# Patient Record
Sex: MALE | Race: OTHER | HISPANIC OR LATINO | Employment: FULL TIME | URBAN - METROPOLITAN AREA
[De-identification: names, ages, dates, MRNs, and addresses within clinical notes are randomized per-mention and may not be internally consistent; named-entity substitution may affect disease eponyms.]

---

## 2020-10-14 ENCOUNTER — HOSPITAL ENCOUNTER (EMERGENCY)
Facility: HOSPITAL | Age: 35
Discharge: HOME | End: 2020-10-14
Attending: EMERGENCY MEDICINE | Admitting: EMERGENCY MEDICINE

## 2020-10-14 VITALS
TEMPERATURE: 98.6 F | OXYGEN SATURATION: 99 % | DIASTOLIC BLOOD PRESSURE: 89 MMHG | SYSTOLIC BLOOD PRESSURE: 146 MMHG | HEART RATE: 96 BPM | WEIGHT: 206.3 LBS | RESPIRATION RATE: 18 BRPM

## 2020-10-14 DIAGNOSIS — L23.7 POISON IVY DERMATITIS: Primary | ICD-10-CM

## 2020-10-14 PROCEDURE — 99284 EMERGENCY DEPT VISIT MOD MDM: CPT | Performed by: EMERGENCY MEDICINE

## 2020-10-14 PROCEDURE — 99282 EMERGENCY DEPT VISIT SF MDM: CPT

## 2020-10-14 RX ORDER — DIPHENHYDRAMINE HCL 25 MG
25 TABLET ORAL EVERY 6 HOURS PRN
Qty: 20 TABLET | Refills: 0 | Status: SHIPPED | OUTPATIENT
Start: 2020-10-14

## 2020-10-14 RX ORDER — PREDNISONE 10 MG/1
TABLET ORAL
Qty: 26 TABLET | Refills: 0 | Status: SHIPPED | OUTPATIENT
Start: 2020-10-14

## 2020-10-14 RX ORDER — PREDNISONE 20 MG/1
60 TABLET ORAL ONCE
Status: COMPLETED | OUTPATIENT
Start: 2020-10-14 | End: 2020-10-14

## 2020-10-14 RX ADMIN — PREDNISONE 60 MG: 20 TABLET ORAL at 17:41

## 2021-08-03 ENCOUNTER — HOSPITAL ENCOUNTER (EMERGENCY)
Facility: HOSPITAL | Age: 36
Discharge: HOME | End: 2021-08-03

## 2021-08-03 VITALS
HEART RATE: 80 BPM | OXYGEN SATURATION: 99 % | TEMPERATURE: 98.7 F | RESPIRATION RATE: 18 BRPM | SYSTOLIC BLOOD PRESSURE: 130 MMHG | DIASTOLIC BLOOD PRESSURE: 80 MMHG

## 2021-08-03 DIAGNOSIS — N47.2 PARAPHIMOSIS: Primary | ICD-10-CM

## 2021-08-03 LAB
ALBUMIN SERPL BCP-MCNC: 4.4 G/DL (ref 3.4–4.8)
ALP SERPL-CCNC: 83.7 U/L (ref 10–129)
ALT SERPL W P-5'-P-CCNC: 12 U/L (ref 5–63)
ANION GAP SERPL CALCULATED.3IONS-SCNC: 9 MMOL/L (ref 4–13)
AST SERPL W P-5'-P-CCNC: 13 U/L (ref 15–41)
BACTERIA UR QL AUTO: ABNORMAL /HPF
BASOPHILS # BLD AUTO: 0.03 THOUSANDS/ΜL (ref 0–0.1)
BASOPHILS NFR BLD AUTO: 0 % (ref 0–1)
BILIRUB SERPL-MCNC: 0.89 MG/DL (ref 0.3–1.2)
BILIRUB UR QL STRIP: ABNORMAL
BUN SERPL-MCNC: 13 MG/DL (ref 6–20)
CALCIUM SERPL-MCNC: 9.7 MG/DL (ref 8.4–10.2)
CHLORIDE SERPL-SCNC: 100 MMOL/L (ref 96–108)
CLARITY UR: CLEAR
CO2 SERPL-SCNC: 25 MMOL/L (ref 22–33)
COLOR UR: YELLOW
CREAT SERPL-MCNC: 1.05 MG/DL (ref 0.5–1.2)
EOSINOPHIL # BLD AUTO: 0.09 THOUSAND/ΜL (ref 0–0.61)
EOSINOPHIL NFR BLD AUTO: 1 % (ref 0–6)
ERYTHROCYTE [DISTWIDTH] IN BLOOD BY AUTOMATED COUNT: 13.7 % (ref 11.6–15.1)
GFR SERPL CREATININE-BSD FRML MDRD: 93 ML/MIN/1.73SQ M
GLUCOSE SERPL-MCNC: 110 MG/DL (ref 65–140)
GLUCOSE SERPL-MCNC: 115 MG/DL (ref 65–140)
GLUCOSE UR STRIP-MCNC: NEGATIVE MG/DL
HCT VFR BLD AUTO: 47.2 % (ref 36.5–49.3)
HGB BLD-MCNC: 16.2 G/DL (ref 12–17)
HGB UR QL STRIP.AUTO: ABNORMAL
IMM GRANULOCYTES # BLD AUTO: 0.04 THOUSAND/UL (ref 0–0.2)
IMM GRANULOCYTES NFR BLD AUTO: 0 % (ref 0–2)
KETONES UR STRIP-MCNC: ABNORMAL MG/DL
LEUKOCYTE ESTERASE UR QL STRIP: ABNORMAL
LYMPHOCYTES # BLD AUTO: 1.94 THOUSANDS/ΜL (ref 0.6–4.47)
LYMPHOCYTES NFR BLD AUTO: 20 % (ref 14–44)
MCH RBC QN AUTO: 29.7 PG (ref 26.8–34.3)
MCHC RBC AUTO-ENTMCNC: 34.3 G/DL (ref 31.4–37.4)
MCV RBC AUTO: 87 FL (ref 82–98)
MONOCYTES # BLD AUTO: 0.75 THOUSAND/ΜL (ref 0.17–1.22)
MONOCYTES NFR BLD AUTO: 8 % (ref 4–12)
MUCOUS THREADS UR QL AUTO: ABNORMAL
NEUTROPHILS # BLD AUTO: 7.02 THOUSANDS/ΜL (ref 1.85–7.62)
NEUTS SEG NFR BLD AUTO: 71 % (ref 43–75)
NITRITE UR QL STRIP: NEGATIVE
NON-SQ EPI CELLS URNS QL MICRO: ABNORMAL /HPF
PH UR STRIP.AUTO: 6.5 [PH]
PLATELET # BLD AUTO: 302 THOUSANDS/UL (ref 149–390)
PMV BLD AUTO: 9.1 FL (ref 8.9–12.7)
POTASSIUM SERPL-SCNC: 3.9 MMOL/L (ref 3.5–5)
PROT SERPL-MCNC: 7.7 G/DL (ref 6.4–8.3)
PROT UR STRIP-MCNC: NEGATIVE MG/DL
RBC # BLD AUTO: 5.45 MILLION/UL (ref 3.88–5.62)
RBC #/AREA URNS AUTO: ABNORMAL /HPF
SODIUM SERPL-SCNC: 134 MMOL/L (ref 133–145)
SP GR UR STRIP.AUTO: 1.02 (ref 1–1.03)
UROBILINOGEN UR QL STRIP.AUTO: 1 E.U./DL
WBC # BLD AUTO: 9.87 THOUSAND/UL (ref 4.31–10.16)
WBC #/AREA URNS AUTO: ABNORMAL /HPF

## 2021-08-03 PROCEDURE — 82948 REAGENT STRIP/BLOOD GLUCOSE: CPT

## 2021-08-03 PROCEDURE — 99283 EMERGENCY DEPT VISIT LOW MDM: CPT

## 2021-08-03 PROCEDURE — 99284 EMERGENCY DEPT VISIT MOD MDM: CPT | Performed by: PHYSICIAN ASSISTANT

## 2021-08-03 PROCEDURE — 85025 COMPLETE CBC W/AUTO DIFF WBC: CPT | Performed by: PHYSICIAN ASSISTANT

## 2021-08-03 PROCEDURE — 81001 URINALYSIS AUTO W/SCOPE: CPT | Performed by: PHYSICIAN ASSISTANT

## 2021-08-03 PROCEDURE — 80053 COMPREHEN METABOLIC PANEL: CPT | Performed by: PHYSICIAN ASSISTANT

## 2021-08-03 PROCEDURE — 36415 COLL VENOUS BLD VENIPUNCTURE: CPT | Performed by: PHYSICIAN ASSISTANT

## 2021-08-03 RX ORDER — TRIAMCINOLONE ACETONIDE 1 MG/G
CREAM TOPICAL 2 TIMES DAILY
Qty: 30 G | Refills: 0 | Status: SHIPPED | OUTPATIENT
Start: 2021-08-03

## 2021-08-03 RX ORDER — IBUPROFEN 600 MG/1
600 TABLET ORAL ONCE
Status: COMPLETED | OUTPATIENT
Start: 2021-08-03 | End: 2021-08-03

## 2021-08-03 RX ORDER — CEPHALEXIN 500 MG/1
500 CAPSULE ORAL 3 TIMES DAILY
Qty: 21 CAPSULE | Refills: 0 | Status: SHIPPED | OUTPATIENT
Start: 2021-08-03 | End: 2021-08-10

## 2021-08-03 RX ORDER — FLUCONAZOLE 100 MG/1
200 TABLET ORAL ONCE
Status: COMPLETED | OUTPATIENT
Start: 2021-08-03 | End: 2021-08-03

## 2021-08-03 RX ADMIN — IBUPROFEN 600 MG: 600 TABLET, FILM COATED ORAL at 08:58

## 2021-08-03 RX ADMIN — FLUCONAZOLE 200 MG: 100 TABLET ORAL at 08:59

## 2021-08-03 NOTE — DISCHARGE INSTRUCTIONS
Use Tylenol 650mg every 4 hours or Motrin 600mg every 6 hours; you can alternate the 2 medications taking something every 3 hours for pain  Take all oral antibiotics until done  Keep area clean and make sure you retract the foreskin back over the tip after cleaning  Mantenga el area limpia y asegurese de retraer el prepucio sobre el pene despues de la limpieza  Use steroid cream to swollen area until cleared  Use over the counter "Tinactin powder" to area while working to keep area dry from sweating  Use crema con esteroides en el area hinchanda hasta que se aclare  Use "Tinactin powder" en el area mientras trabaja para mantener el area seca sin sudar    Follow-up with urologist in next few days

## 2021-08-03 NOTE — ED PROVIDER NOTES
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 Taking? diphenhydrAMINE (BENADRYL) 25 mg tablet   No No   Sig: Take 1 tablet (25 mg total) by mouth every 6 (six) hours as needed for itching for up to 15 doses   predniSONE 10 mg tablet   No No   Sig: 3 tabs BID x 2 days, then 2 tabs BID x 2 days, then 1 tab BID x 2 day the 1 tab daily x 2 day      Facility-Administered Medications: None       History reviewed  No pertinent past medical history  Past Surgical History:   Procedure Laterality Date    APPENDECTOMY         History reviewed  No pertinent family history  I have reviewed and agree with the history as documented  E-Cigarette/Vaping    E-Cigarette Use Never User      E-Cigarette/Vaping Substances     Social History     Tobacco Use    Smoking status: Never Smoker    Smokeless tobacco: Never Used   Vaping Use    Vaping Use: Never used   Substance Use Topics    Alcohol use: Yes    Drug use: Not Currently       Review of Systems   Constitutional: Negative for chills and fever  HENT: Negative for sore throat and trouble swallowing      Eyes: Negative for discharge and visual disturbance  Respiratory: Negative for shortness of breath  Cardiovascular: Negative for chest pain  Gastrointestinal: Negative for abdominal pain, blood in stool, constipation, diarrhea, nausea and vomiting  Genitourinary: Positive for penile pain and penile swelling  Negative for difficulty urinating, discharge, dysuria, flank pain, frequency, genital sores, hematuria, scrotal swelling, testicular pain and urgency  Musculoskeletal: Negative for arthralgias and myalgias  Skin: Negative for color change and pallor  Neurological: Negative for dizziness and weakness  Psychiatric/Behavioral: Negative for behavioral problems  All other systems reviewed and are negative  Physical Exam  Physical Exam  Vitals and nursing note reviewed  Exam conducted with a chaperone present  Constitutional:       General: He is not in acute distress  Appearance: He is well-developed  He is obese  HENT:      Head: Normocephalic and atraumatic  Right Ear: External ear normal       Left Ear: External ear normal       Nose: Nose normal       Mouth/Throat:      Mouth: Mucous membranes are moist       Pharynx: Oropharynx is clear  Eyes:      Conjunctiva/sclera: Conjunctivae normal    Cardiovascular:      Rate and Rhythm: Normal rate  Pulmonary:      Effort: Pulmonary effort is normal  No respiratory distress  Abdominal:      General: Bowel sounds are normal       Palpations: Abdomen is soft  Tenderness: There is no abdominal tenderness  Hernia: No hernia is present  Genitourinary:     Penis: Uncircumcised  Paraphimosis, tenderness and swelling present  Testes: Normal  Cremasteric reflex is present  Musculoskeletal:         General: Normal range of motion  Cervical back: Normal range of motion  Right lower leg: No edema  Left lower leg: No edema  Skin:     General: Skin is warm and dry  Capillary Refill: Capillary refill takes less than 2 seconds        Findings: No lesion or rash  Neurological:      General: No focal deficit present  Mental Status: He is alert and oriented to person, place, and time  Motor: No weakness  Psychiatric:         Behavior: Behavior normal          Vital Signs  ED Triage Vitals   Temperature Pulse Respirations Blood Pressure SpO2   08/03/21 0825 08/03/21 0825 08/03/21 0825 08/03/21 0825 08/03/21 0825   98 7 °F (37 1 °C) 84 18 138/96 100 %      Temp Source Heart Rate Source Patient Position - Orthostatic VS BP Location FiO2 (%)   08/03/21 0825 08/03/21 0825 08/03/21 0825 08/03/21 0825 --   Oral Monitor Sitting Left arm       Pain Score       08/03/21 0920       No Pain           Vitals:    08/03/21 0825 08/03/21 0930 08/03/21 1015   BP: 138/96 131/82 130/80   Pulse: 84 81 80   Patient Position - Orthostatic VS: Sitting           Visual Acuity      ED Medications  Medications   fluconazole (DIFLUCAN) tablet 200 mg (200 mg Oral Given 8/3/21 0859)   ibuprofen (MOTRIN) tablet 600 mg (600 mg Oral Given 8/3/21 0858)       Diagnostic Studies  Results Reviewed     Procedure Component Value Units Date/Time    UA w Reflex to Microscopic w Reflex to Culture [[id number]]  (Abnormal) Collected: 08/03/21 1017    Lab Status: Final result Specimen: Urine, Clean Catch Updated: 08/03/21 1028     Color, UA Yellow     Clarity, UA Clear     Specific Rindge, UA 1 020     pH, UA 6 5     Leukocytes, UA Trace     Nitrite, UA Negative     Protein, UA Negative mg/dl      Glucose, UA Negative mg/dl      Ketones, UA Trace mg/dl      Urobilinogen, UA 1 0 E U /dl      Bilirubin, UA 1+     Blood, UA 2+    Urine Microscopic [[id number]] Collected: 08/03/21 1017    Lab Status:  In process Specimen: Urine, Clean Catch Updated: 08/03/21 1028    Comprehensive metabolic panel [[id number]]  (Abnormal) Collected: 08/03/21 0858    Lab Status: Final result Specimen: Blood from Arm, Left Updated: 08/03/21 0920     Sodium 134 mmol/L      Potassium 3 9 mmol/L      Chloride 100 mmol/L      CO2 25 mmol/L      ANION GAP 9 mmol/L      BUN 13 mg/dL      Creatinine 1 05 mg/dL      Glucose 110 mg/dL      Calcium 9 7 mg/dL      AST 13 U/L      ALT 12 U/L      Alkaline Phosphatase 83 7 U/L      Total Protein 7 7 g/dL      Albumin 4 4 g/dL      Total Bilirubin 0 89 mg/dL      eGFR 93 ml/min/1 73sq m     Narrative:      National Kid[employer] Disease Foundation guidelines for Chronic Kidney Disease (CKD):     Stage 1 with normal or high GFR (GFR > 90 mL/min/1 73 square meters)    Stage 2 Mild CKD (GFR = 60-89 mL/min/1 73 square meters)    Stage 3A Moderate CKD (GFR = 45-59 mL/min/1 73 square meters)    Stage 3B Moderate CKD (GFR = 30-44 mL/min/1 73 square meters)    Stage 4 Severe CKD (GFR = 15-29 mL/min/1 73 square meters)    Stage 5 End Stage CKD (GFR <15 mL/min/1 73 square meters)  Note: GFR calculation is accurate only with a steady state creatinine    CBC and differential [[id number]]  (Normal) Collected: 08/03/21 0858    Lab Status: Final result Specimen: Blood from Arm, Left Updated: 08/03/21 0903     WBC 9 87 Thousand/uL      RBC 5 45 Million/uL      Hemoglobin 16 2 g/dL      Hematocrit 47 2 %      MCV 87 fL      MCH 29 7 pg      MCHC 34 3 g/dL      RDW 13 7 %      MPV 9 1 fL      Platelets 964 Thousands/uL      Neutrophils Relative 71 %      Immat GRANS % 0 %      Lymphocytes Relative 20 %      Monocytes Relative 8 %      Eosinophils Relative 1 %      Basophils Relative 0 %      Neutrophils Absolute 7 02 Thousands/µL      Immature Grans Absolute 0 04 Thousand/uL      Lymphocytes Absolute 1 94 Thousands/µL      Monocytes Absolute 0 75 Thousand/µL      Eosinophils Absolute 0 09 Thousand/µL      Basophils Absolute 0 03 Thousands/µL     Fingerstick Glucose (POCT) [[id number]]  (Normal) Collected: 08/03/21 0851    Lab Status: Final result Updated: 08/03/21 0852     POC Glucose 115 mg/dl                  No orders to display              Procedures  Procedures         ED Course  ED Course as of Aug 13 1052   Tu Aug 03, 2021   1002 Cbc, cmp wnl, awaiting UA collection                                              MDM  Number of Diagnoses or Management Options  Paraphimosis  Diagnosis management comments: Attending, Dr Carmina Hutson evaluating pt with me, performed gentle pressure and retraction of foreskin back over paraphimosis, pt feeling better  Glu 115, labs wnl, will cover skin and urine with keflex and steroid cream   Keep foreskin pulled over tip for next few days until swelling improved       Amount and/or Complexity of Data Reviewed  Clinical lab tests: ordered and reviewed  Review and summarize past medical records: yes  Discuss the patient with other providers: yes        Disposition  Final diagnoses:   Paraphimosis     Time reflects when diagnosis was documented in both MDM as applicable and the Disposition within this note     Time User Action Codes Description Comment    8/3/2021  8:55 AM Jamil Barker Add [N47 2] Paraphimosis       ED Disposition     ED Disposition Condition Date/Time Comment    Discharge Stable Tue Aug 3, 2021 10:50 AM Sherri Jung discharge to home/self care  Follow-up Information     Follow up With Specialties Details Why Contact Info    Papo Jean-Baptiste MD Urology   [address]  [phone]            Patient's Medications   Discharge Prescriptions    CEPHALEXIN (KEFLEX) 500 MG CAPSULE    Take 1 capsule (500 mg total) by mouth 3 (three) times a day for 7 days       Start Date: 8/3/2021  End Date: 8/10/2021       Order Dose: 500 mg       Quantity: 21 capsule    Refills: 0    TRIAMCINOLONE (KENALOG) 0 1 % CREAM    Apply topically 2 (two) times a day       Start Date: 8/3/2021  End Date: --       Order Dose: --       Quantity: 30 g    Refills: 0     No discharge procedures on file      PDMP Review     None          ED Provider  Electronically Signed by           Viridiana Clinton PA-C  08/03/21 8417

## 2023-01-18 ENCOUNTER — APPOINTMENT (EMERGENCY)
Dept: RADIOLOGY | Facility: HOSPITAL | Age: 38
End: 2023-01-18

## 2023-01-18 ENCOUNTER — HOSPITAL ENCOUNTER (EMERGENCY)
Facility: HOSPITAL | Age: 38
Discharge: HOME | End: 2023-01-18
Attending: EMERGENCY MEDICINE

## 2023-01-18 VITALS
HEART RATE: 92 BPM | OXYGEN SATURATION: 94 % | SYSTOLIC BLOOD PRESSURE: 121 MMHG | DIASTOLIC BLOOD PRESSURE: 77 MMHG | TEMPERATURE: 98.2 F | RESPIRATION RATE: 18 BRPM

## 2023-01-18 DIAGNOSIS — R05.9 COUGH: ICD-10-CM

## 2023-01-18 DIAGNOSIS — R07.81 RIB PAIN ON LEFT SIDE: Primary | ICD-10-CM

## 2023-01-18 DIAGNOSIS — Z87.09 HISTORY OF ASTHMA: ICD-10-CM

## 2023-01-18 LAB
ANION GAP SERPL CALCULATED.3IONS-SCNC: 7 MMOL/L (ref 4–13)
ATRIAL RATE: 94 BPM
BASOPHILS # BLD AUTO: 0.06 THOUSANDS/ÂΜL (ref 0–0.1)
BASOPHILS NFR BLD AUTO: 0 % (ref 0–1)
BUN SERPL-MCNC: 12 MG/DL (ref 5–25)
CALCIUM SERPL-MCNC: 10 MG/DL (ref 8.4–10.2)
CARDIAC TROPONIN I PNL SERPL HS: 2 NG/L
CHLORIDE SERPL-SCNC: 99 MMOL/L (ref 96–108)
CO2 SERPL-SCNC: 31 MMOL/L (ref 21–32)
CREAT SERPL-MCNC: 1.11 MG/DL (ref 0.6–1.3)
D DIMER PPP FEU-MCNC: 0.48 UG/ML FEU
EOSINOPHIL # BLD AUTO: 0.12 THOUSAND/ÂΜL (ref 0–0.61)
EOSINOPHIL NFR BLD AUTO: 1 % (ref 0–6)
ERYTHROCYTE [DISTWIDTH] IN BLOOD BY AUTOMATED COUNT: 13.6 % (ref 11.6–15.1)
GFR SERPL CREATININE-BSD FRML MDRD: 85 ML/MIN/1.73SQ M
GLUCOSE SERPL-MCNC: 87 MG/DL (ref 65–140)
HCT VFR BLD AUTO: 44.8 % (ref 36.5–49.3)
HGB BLD-MCNC: 15.5 G/DL (ref 12–17)
IMM GRANULOCYTES # BLD AUTO: 0.08 THOUSAND/UL (ref 0–0.2)
IMM GRANULOCYTES NFR BLD AUTO: 1 % (ref 0–2)
LYMPHOCYTES # BLD AUTO: 3.97 THOUSANDS/ÂΜL (ref 0.6–4.47)
LYMPHOCYTES NFR BLD AUTO: 29 % (ref 14–44)
MCH RBC QN AUTO: 29.6 PG (ref 26.8–34.3)
MCHC RBC AUTO-ENTMCNC: 34.6 G/DL (ref 31.4–37.4)
MCV RBC AUTO: 86 FL (ref 82–98)
MONOCYTES # BLD AUTO: 1.23 THOUSAND/ÂΜL (ref 0.17–1.22)
MONOCYTES NFR BLD AUTO: 9 % (ref 4–12)
NEUTROPHILS # BLD AUTO: 8.26 THOUSANDS/ÂΜL (ref 1.85–7.62)
NEUTS SEG NFR BLD AUTO: 60 % (ref 43–75)
NRBC BLD AUTO-RTO: 0 /100 WBCS
P AXIS: 60 DEGREES
PLATELET # BLD AUTO: 345 THOUSANDS/UL (ref 149–390)
PMV BLD AUTO: 9.1 FL (ref 8.9–12.7)
POTASSIUM SERPL-SCNC: 3.7 MMOL/L (ref 3.5–5.3)
PR INTERVAL: 140 MS
QRS AXIS: -37 DEGREES
QRSD INTERVAL: 80 MS
QT INTERVAL: 342 MS
QTC INTERVAL: 427 MS
RBC # BLD AUTO: 5.23 MILLION/UL (ref 3.88–5.62)
SODIUM SERPL-SCNC: 137 MMOL/L (ref 135–147)
T WAVE AXIS: 41 DEGREES
VENTRICULAR RATE: 94 BPM
WBC # BLD AUTO: 13.72 THOUSAND/UL (ref 4.31–10.16)

## 2023-01-18 RX ORDER — BENZONATATE 100 MG/1
100 CAPSULE ORAL EVERY 8 HOURS
Qty: 21 CAPSULE | Refills: 0 | Status: SHIPPED | OUTPATIENT
Start: 2023-01-18

## 2023-01-18 RX ORDER — NAPROXEN 500 MG/1
500 TABLET ORAL 2 TIMES DAILY WITH MEALS
Qty: 30 TABLET | Refills: 0 | Status: SHIPPED | OUTPATIENT
Start: 2023-01-18

## 2023-01-18 RX ORDER — SODIUM CHLORIDE 9 MG/ML
3 INJECTION INTRAVENOUS
Status: DISCONTINUED | OUTPATIENT
Start: 2023-01-18 | End: 2023-01-18 | Stop reason: HOSPADM

## 2023-01-18 RX ORDER — METHOCARBAMOL 500 MG/1
500 TABLET, FILM COATED ORAL 3 TIMES DAILY PRN
Qty: 20 TABLET | Refills: 0 | Status: SHIPPED | OUTPATIENT
Start: 2023-01-18

## 2023-01-18 RX ORDER — ACETAMINOPHEN 500 MG
1000 TABLET ORAL EVERY 6 HOURS PRN
Qty: 40 TABLET | Refills: 0 | Status: SHIPPED | OUTPATIENT
Start: 2023-01-18

## 2023-01-18 RX ORDER — LIDOCAINE 50 MG/G
1 PATCH TOPICAL ONCE
Status: DISCONTINUED | OUTPATIENT
Start: 2023-01-18 | End: 2023-01-18 | Stop reason: HOSPADM

## 2023-01-18 RX ORDER — LIDOCAINE 50 MG/G
1 PATCH TOPICAL DAILY
Qty: 30 PATCH | Refills: 0 | Status: SHIPPED | OUTPATIENT
Start: 2023-01-18 | End: 2023-02-17

## 2023-01-18 RX ORDER — KETOROLAC TROMETHAMINE 30 MG/ML
15 INJECTION, SOLUTION INTRAMUSCULAR; INTRAVENOUS ONCE
Status: COMPLETED | OUTPATIENT
Start: 2023-01-18 | End: 2023-01-18

## 2023-01-18 RX ORDER — ALBUTEROL SULFATE 90 UG/1
2 AEROSOL, METERED RESPIRATORY (INHALATION) ONCE
Status: COMPLETED | OUTPATIENT
Start: 2023-01-18 | End: 2023-01-18

## 2023-01-18 RX ORDER — ACETAMINOPHEN 325 MG/1
975 TABLET ORAL ONCE
Status: COMPLETED | OUTPATIENT
Start: 2023-01-18 | End: 2023-01-18

## 2023-01-18 RX ADMIN — ACETAMINOPHEN 975 MG: 325 TABLET, FILM COATED ORAL at 17:01

## 2023-01-18 RX ADMIN — ALBUTEROL SULFATE 2 PUFF: 90 AEROSOL, METERED RESPIRATORY (INHALATION) at 17:47

## 2023-01-18 RX ADMIN — LIDOCAINE 5% 1 PATCH: 700 PATCH TOPICAL at 17:01

## 2023-01-18 RX ADMIN — KETOROLAC TROMETHAMINE 15 MG: 30 INJECTION, SOLUTION INTRAMUSCULAR at 17:01

## 2023-01-18 NOTE — ED PROVIDER NOTES
History  Chief Complaint   Patient presents with   • Rib Pain     Rib pain onset last week from coughing, pain only with coughing or movement     37-year-old male with history of paraphimosis, self-reported history of asthma presenting the emergency department with cough for 1 week  Patient reports left-sided chest pain that developed after this cough  Pain is worse with coughing but notes that the pain is constant now and only worsened by the cough  Denies hemoptysis  Pain worse with movement  Pain is pleuritic  No history of VTE  No recent travel or hospitalization  No swelling in 1 leg more than the other  No hemoptysis  No history of hypertension, hypercholesterolemia, diabetes, family history of CAD before age 72  Chest Pain  Pain location:  L lateral chest  Pain quality: aching    Pain radiates to:  Does not radiate  Pain radiates to the back: no    Pain severity:  Moderate  Onset quality:  Sudden  Timing:  Constant  Progression:  Waxing and waning  Chronicity:  New  Context comment:  Coughing  Relieved by:  Nothing  Worsened by:  Nothing tried  Ineffective treatments:  None tried      Prior to Admission Medications   Prescriptions Last Dose Informant Patient Reported? Taking? diphenhydrAMINE (BENADRYL) 25 mg tablet   No No   Sig: Take 1 tablet (25 mg total) by mouth every 6 (six) hours as needed for itching for up to 15 doses   predniSONE 10 mg tablet   No No   Sig: 3 tabs BID x 2 days, then 2 tabs BID x 2 days, then 1 tab BID x 2 day the 1 tab daily x 2 day   triamcinolone (KENALOG) 0 1 % cream   No No   Sig: Apply topically 2 (two) times a day      Facility-Administered Medications: None       No past medical history on file  Past Surgical History:   Procedure Laterality Date   • APPENDECTOMY         No family history on file  I have reviewed and agree with the history as documented      E-Cigarette/Vaping   • E-Cigarette Use Never User      E-Cigarette/Vaping Substances     Social History     Tobacco Use   • Smoking status: Never   • Smokeless tobacco: Never   Vaping Use   • Vaping Use: Never used   Substance Use Topics   • Alcohol use: Yes   • Drug use: Not Currently       Review of Systems   Cardiovascular: Positive for chest pain  All other systems reviewed and are negative  Physical Exam  Physical Exam  Vitals and nursing note reviewed  Constitutional:       General: He is not in acute distress  Appearance: He is well-developed  He is not diaphoretic  HENT:      Head: Normocephalic and atraumatic  Right Ear: External ear normal       Left Ear: External ear normal    Eyes:      Conjunctiva/sclera: Conjunctivae normal    Neck:      Trachea: No tracheal deviation  Cardiovascular:      Rate and Rhythm: Normal rate and regular rhythm  Heart sounds: Normal heart sounds  No murmur heard  Pulmonary:      Effort: No respiratory distress  Breath sounds: Normal breath sounds  No stridor  No wheezing or rales  Abdominal:      General: Bowel sounds are normal  There is no distension  Palpations: Abdomen is soft  There is no mass  Tenderness: There is no abdominal tenderness  There is no guarding or rebound  Musculoskeletal:         General: Tenderness present  No deformity  Comments: Tenderness to palpation of the left anterior rib cage  Skin:     General: Skin is dry  Findings: No rash  Neurological:      Motor: No abnormal muscle tone  Coordination: Coordination normal    Psychiatric:         Behavior: Behavior normal          Thought Content:  Thought content normal          Judgment: Judgment normal          Vital Signs  ED Triage Vitals [01/18/23 1635]   Temperature Pulse Respirations Blood Pressure SpO2   98 2 °F (36 8 °C) 98 20 132/91 99 %      Temp Source Heart Rate Source Patient Position - Orthostatic VS BP Location FiO2 (%)   Oral -- Sitting Left arm --      Pain Score       8           Vitals:    01/18/23 1635 01/18/23 1744 BP: 132/91 121/77   Pulse: 98 92   Patient Position - Orthostatic VS: Sitting          Visual Acuity      ED Medications  Medications   ketorolac (TORADOL) injection 15 mg (15 mg Intravenous Given 1/18/23 1701)   acetaminophen (TYLENOL) tablet 975 mg (975 mg Oral Given 1/18/23 1701)   albuterol (PROVENTIL HFA,VENTOLIN HFA) inhaler 2 puff (2 puffs Inhalation Given 1/18/23 1747)       Diagnostic Studies  Results Reviewed     Procedure Component Value Units Date/Time    D-dimer, quantitative [[id number]]  (Normal) Collected: 01/18/23 1656    Lab Status: Final result Specimen: Blood from Arm, Left Updated: 01/18/23 1736     D-Dimer, Quant 0 48 ug/ml FEU     HS Troponin 0hr (reflex protocol) [[id number]]  (Normal) Collected: 01/18/23 1656    Lab Status: Final result Specimen: Blood from Arm, Left Updated: 01/18/23 1725     hs TnI 0hr 2 ng/L     Basic metabolic panel [[id number]] Collected: 01/18/23 1656    Lab Status: Final result Specimen: Blood from Arm, Left Updated: 01/18/23 1717     Sodium 137 mmol/L      Potassium 3 7 mmol/L      Chloride 99 mmol/L      CO2 31 mmol/L      ANION GAP 7 mmol/L      BUN 12 mg/dL      Creatinine 1 11 mg/dL      Glucose 87 mg/dL      Calcium 10 0 mg/dL      eGFR 85 ml/min/1 73sq m     Narrative:      [employer] guidelines for Chronic Kidney Disease (CKD):   •  Stage 1 with normal or high GFR (GFR > 90 mL/min/1 73 square meters)  •  Stage 2 Mild CKD (GFR = 60-89 mL/min/1 73 square meters)  •  Stage 3A Moderate CKD (GFR = 45-59 mL/min/1 73 square meters)  •  Stage 3B Moderate CKD (GFR = 30-44 mL/min/1 73 square meters)  •  Stage 4 Severe CKD (GFR = 15-29 mL/min/1 73 square meters)  •  Stage 5 End Stage CKD (GFR <15 mL/min/1 73 square meters)  Note: GFR calculation is accurate only with a steady state creatinine    CBC and differential [[id number]]  (Abnormal) Collected: 01/18/23 1656    Lab Status: Final result Specimen: Blood from Arm, Left Updated: 01/18/23 6805 WBC 13 72 Thousand/uL      RBC 5 23 Million/uL      Hemoglobin 15 5 g/dL      Hematocrit 44 8 %      MCV 86 fL      MCH 29 6 pg      MCHC 34 6 g/dL      RDW 13 6 %      MPV 9 1 fL      Platelets 963 Thousands/uL      nRBC 0 /100 WBCs      Neutrophils Relative 60 %      Immat GRANS % 1 %      Lymphocytes Relative 29 %      Monocytes Relative 9 %      Eosinophils Relative 1 %      Basophils Relative 0 %      Neutrophils Absolute 8 26 Thousands/µL      Immature Grans Absolute 0 08 Thousand/uL      Lymphocytes Absolute 3 97 Thousands/µL      Monocytes Absolute 1 23 Thousand/µL      Eosinophils Absolute 0 12 Thousand/µL      Basophils Absolute 0 06 Thousands/µL                  X-ray chest 1 view portable   ED Interpretation by Sana Pierre DO (01/18 1731)   No acute cardiopulmonary findings  Procedures  Procedures         ED Course             HEART Risk Score    Flowsheet Row Most Recent Value   Heart Score Risk Calculator    History 0 Filed at: 01/18/2023 1948   ECG 0 Filed at: 01/18/2023 1948   Age 0 Filed at: 01/18/2023 1948   Risk Factors 1 Filed at: 01/18/2023 1948   Troponin 0 Filed at: 01/18/2023 1948   HEART Score 1 Filed at: 01/18/2023 1948                        SBIRT 20yo+    Flowsheet Row Most Recent Value   SBIRT (25 yo +)    In order to provide better care to our patients, we are screening all of our patients for alcohol and drug use  Would it be okay to ask you these screening questions? No Filed at: 01/18/2023 9664                    Medical Decision Making  53-year-old male presenting with left-sided rib cage pain  Worse with breathing and deep  No hemoptysis, or other VTE risk factors  Likely viral illness  A week into the viral illness  No reason for viral testing at this point  Will evaluate for ACS, arrhythmia, electrolyte abnormalities, pneumonia, pneumothorax, acute intrathoracic abnormality  D-dimer is negative  Low Wells score  Troponin is negative    No signs of ischemia or arrhythmia on ECG  Chest x-ray with no acute thoracic abnormalities per my read of chest x-ray  Will discharge home  Follow-up with PCP  Return precautions given  Self-reported history of asthma  No wheezing here  Not consistent with an asthma exacerbation  Will provide albuterol inhaler  Procedure Note: EKG  Date/Time: 01/18/23 5PM  Interpreted by: Edgardo Gonzalez  Indications / Diagnosis: CP  ECG reviewed by me, the ED Provider: yes   The EKG demonstrates:  Rhythm: normal sinus  Intervals: normal intervals  Axis: L axis  QRS/Blocks: normal QRS  ST Changes: No acute ST Changes, no STD/SONJA  Cough: acute illness or injury  History of asthma: acute illness or injury  Rib pain on left side: acute illness or injury  Amount and/or Complexity of Data Reviewed  Labs: ordered  Radiology: ordered and independent interpretation performed  Decision-making details documented in ED Course  ECG/medicine tests: independent interpretation performed  Decision-making details documented in ED Course  Risk  OTC drugs  Prescription drug management  Disposition  Final diagnoses:   Rib pain on left side   History of asthma   Cough     Time reflects when diagnosis was documented in both MDM as applicable and the Disposition within this note     Time User Action Codes Description Comment    1/18/2023  5:38 PM Edgardo Max [R07 81] Rib pain on left side     1/18/2023  5:38 PM Edgardo Max [Z87 09] History of asthma     1/18/2023  5:38 PM Edgardo Max [R05 9] Cough       ED Disposition     ED Disposition   Discharge    Condition   Stable    Date/Time   Wed Jan 18, 2023  5:38 PM    [address] discharge to home/self care                 Follow-up Information     Follow up With Specialties Details Why Contact Info Additional [id number]  Emergency Department Emergency Medicine  If symptoms worsen [address] [facility] [address] [facility] Emergency Department, [address], [address] [facility] [address]          Discharge Medication List as of 1/18/2023  5:41 PM      START taking these medications    Details   acetaminophen (TYLENOL) 500 mg tablet Take 2 tablets (1,000 mg total) by mouth every 6 (six) hours as needed for mild pain for up to 20 doses, Starting Wed 1/18/2023, Normal      benzonatate (TESSALON PERLES) 100 mg capsule Take 1 capsule (100 mg total) by mouth every 8 (eight) hours, Starting Wed 1/18/2023, Normal      lidocaine (Lidoderm) 5 % Apply 1 patch topically over 12 hours daily for 30 doses Apply for 12 hours  Leave the skin without a patch for 12 hours before reapplying , Starting Wed 1/18/2023, Until Fri 2/17/2023, Normal      methocarbamol (ROBAXIN) 500 mg tablet Take 1 tablet (500 mg total) by mouth 3 (three) times a day as needed (rib cage pain), Starting Wed 1/18/2023, Normal      naproxen (Naprosyn) 500 mg tablet Take 1 tablet (500 mg total) by mouth 2 (two) times a day with meals, Starting Wed 1/18/2023, Normal         CONTINUE these medications which have NOT CHANGED    Details   diphenhydrAMINE (BENADRYL) 25 mg tablet Take 1 tablet (25 mg total) by mouth every 6 (six) hours as needed for itching for up to 15 doses, Starting Wed 10/14/2020, Normal      predniSONE 10 mg tablet 3 tabs BID x 2 days, then 2 tabs BID x 2 days, then 1 tab BID x 2 day the 1 tab daily x 2 day, Normal      triamcinolone (KENALOG) 0 1 % cream Apply topically 2 (two) times a day, Starting Tue 8/3/2021, Normal             No discharge procedures on file      PDMP Review     None          ED Provider  Electronically Signed by           Daxa Scanlon DO  01/18/23 1715  [address] ,   01/18/23 1951

## 2023-01-18 NOTE — DISCHARGE INSTRUCTIONS
Take naproxen and Tylenol as needed for pain  Use the lidocaine patches 12 hours on, 12 hours off before reapplying  Use the muscle relaxer Robaxin as prescribed  This medication will make you tired  Do not take it and drive  Take the [facility] as needed for cough  Follow-up with your primary care provider  Come back to the emergency department for new or worsening symptoms